# Patient Record
Sex: MALE | Race: WHITE | NOT HISPANIC OR LATINO | Employment: FULL TIME | URBAN - METROPOLITAN AREA
[De-identification: names, ages, dates, MRNs, and addresses within clinical notes are randomized per-mention and may not be internally consistent; named-entity substitution may affect disease eponyms.]

---

## 2018-05-30 ENCOUNTER — OFFICE VISIT (OUTPATIENT)
Dept: GASTROENTEROLOGY | Facility: CLINIC | Age: 18
End: 2018-05-30
Payer: COMMERCIAL

## 2018-05-30 VITALS
HEIGHT: 68 IN | TEMPERATURE: 97.4 F | BODY MASS INDEX: 21.92 KG/M2 | SYSTOLIC BLOOD PRESSURE: 107 MMHG | DIASTOLIC BLOOD PRESSURE: 66 MMHG | WEIGHT: 144.6 LBS | HEART RATE: 53 BPM

## 2018-05-30 DIAGNOSIS — K58.0 IRRITABLE BOWEL SYNDROME WITH DIARRHEA: Primary | ICD-10-CM

## 2018-05-30 DIAGNOSIS — K90.0 CELIAC DISEASE: ICD-10-CM

## 2018-05-30 PROCEDURE — 99204 OFFICE O/P NEW MOD 45 MIN: CPT | Performed by: INTERNAL MEDICINE

## 2018-05-30 RX ORDER — TRIAMCINOLONE ACETONIDE 1 MG/G
CREAM TOPICAL
COMMUNITY
Start: 2018-05-18 | End: 2018-06-12 | Stop reason: ALTCHOICE

## 2018-05-30 RX ORDER — DICYCLOMINE HYDROCHLORIDE 10 MG/1
10 CAPSULE ORAL 2 TIMES DAILY PRN
Qty: 60 CAPSULE | Refills: 0 | Status: SHIPPED | OUTPATIENT
Start: 2018-05-30 | End: 2018-06-12 | Stop reason: ALTCHOICE

## 2018-05-30 NOTE — PATIENT INSTRUCTIONS
EGD scheduled in [place] on 6/1/18  Hermes Biggs gave pt verbal instructions/paper work given   Lupe howe RM at 12:30pm

## 2018-05-30 NOTE — LETTER
May 31, 2018     Natalie De Dios DO  [facility], [address] [facility]    Patient: Gloria Moore   YOB: 2000   Date of Visit: 5/30/2018       Dear Dr Alma Linares: Thank you for referring Glorai Moore to me for evaluation  Below are my notes for this consultation  If you have questions, please do not hesitate to call me  I look forward to following your patient along with you  Sincerely,        Rosendo Craven MD        CC: No Recipients  Mikhail Delilah Urrutia  5/30/2018  4:11 PM  Sign at close encounter  [facility] - Outpatient Consultation  Gloria Moore 19 y o  male MRN: [id number]  Encounter: [id number]          ASSESSMENT AND PLAN:      1  Celiac disease    - Mr Odalis Metcalf presents with complains of alternating diarrhea and constipation, abdominal pain, and bloating  Recent labs showed tissue transaminase IgA was over 100  His B12 levels are normal   - labs show normal thyroid function and LFTs are within normal limits  - I will schedule an EGD to confirm this diagnosis  At the time of the procedure, I will take biopsies  I discussed with him the risks and benefits of the procedure  - I explained that T-cell lymphoma is associated with uncontrolled celiac disease  - I will order labs to check his vitamin D levels, hemoglobin A1C, and celiac antibody profile  He will follow up based on the results of the endoscopy  If biopsies are positive, we will refer him to a nutritionist and monitor his labs regularly  ______________________________________________________________________    HPI:      Gloria Moore is a 19 y o  male with complains of alternating diarrhea and constipation with abdominal pain and bloating  Recent labs showed tissue transaminase IgA was over 100   B12 levels are normal  He is otherwise healthy and denies change in stool caliber, melena, hematochezia, rectal bleeding, tenesmus, change in appetite, nausea, vomiting, GERD, dysphagia, odynophagia and unintentional weight loss  He is a nonsmoker, and denies alcohol use  REVIEW OF SYSTEMS:    CONSTITUTIONAL: Denies any fever, chills, rigors, and weight loss  HEENT: No earache or tinnitus  Denies hearing loss or visual disturbances  CARDIOVASCULAR: No chest pain or palpitations  RESPIRATORY: Denies any cough, hemoptysis, shortness of breath or dyspnea on exertion  GASTROINTESTINAL: As noted in the History of Present Illness  GENITOURINARY: No problems with urination  Denies any hematuria or dysuria  NEUROLOGIC: No dizziness or vertigo, denies headaches  MUSCULOSKELETAL: Denies any muscle or joint pain  SKIN: Denies skin rashes or itching  ENDOCRINE: Denies excessive thirst  Denies intolerance to heat or cold  PSYCHOSOCIAL: Denies depression or anxiety  Denies any recent memory loss  Historical Information   History reviewed  No pertinent past medical history  No past surgical history on file  Social History   History   Alcohol use Not on file     History   Drug use: Unknown     History   Smoking Status    Not on file   Smokeless Tobacco    Not on file     No family history on file  Meds/Allergies       Current Outpatient Prescriptions:     dicyclomine (BENTYL) 10 mg capsule    triamcinolone (KENALOG) 0 1 % cream    No Known Allergies        Objective     There were no vitals taken for this visit  There is no height or weight on file to calculate BMI  PHYSICAL EXAM:      General Appearance:   Alert, cooperative, no distress   HEENT:   Normocephalic, atraumatic, anicteric      Neck:  Supple, symmetrical, trachea midline   Lungs:   Clear to auscultation bilaterally; no rales, rhonchi or wheezing; respirations unlabored    Heart[de-identified]   Regular rate and rhythm; no murmur, rub, or gallop     Abdomen:   Soft, non-tender, non-distended; normal bowel sounds; no masses, no organomegaly    Genitalia:   Deferred    Rectal:   Deferred  Extremities:  No cyanosis, clubbing or edema    Pulses:  2+ and symmetric    Skin:  No jaundice, rashes, or lesions    Lymph nodes:  No palpable cervical lymphadenopathy        Attestation:   By signing my name below, I, Lee Mccall, attest that this documentation has been prepared under the direction and in the presence of Ahmet Meadows MD  Electronically Signed: Delilah Tsang  05/30/2018  Mitchel Denver, personally performed the services described in this documentation  All medical record entries made by the jhonatanibluciano were at my direction and in my presence  I have reviewed the chart and discharge instructions and agree that the record reflects my personal performance and is accurate and complete  Ahmet Meadows MD  05/30/2018

## 2018-05-30 NOTE — PROGRESS NOTES
[facility] - Outpatient Consultation  Jessica Cervantes 19 y o  male MRN: [id number]  Encounter: [id number]          ASSESSMENT AND PLAN:      1  Celiac disease    - Mr Peggy Christian presents with complains of alternating diarrhea and constipation, abdominal pain, and bloating  Recent labs showed tissue transaminase IgA was over 100  His B12 levels are normal   - labs show normal thyroid function and LFTs are within normal limits  - I will schedule an EGD to confirm this diagnosis  At the time of the procedure, I will take biopsies  I discussed with him the risks and benefits of the procedure  - I explained that T-cell lymphoma is associated with uncontrolled celiac disease  - I will order labs to check his vitamin D levels, hemoglobin A1C, and celiac antibody profile  He will follow up based on the results of the endoscopy  If biopsies are positive, we will refer him to a nutritionist and monitor his labs regularly  ______________________________________________________________________    HPI:      Jessica Cervantes is a 19 y o  male with complains of alternating diarrhea and constipation with abdominal pain and bloating  Recent labs showed tissue transaminase IgA was over 100  B12 levels are normal  He is otherwise healthy and denies change in stool caliber, melena, hematochezia, rectal bleeding, tenesmus, change in appetite, nausea, vomiting, GERD, dysphagia, odynophagia and unintentional weight loss  He is a nonsmoker, and denies alcohol use  REVIEW OF SYSTEMS:    CONSTITUTIONAL: Denies any fever, chills, rigors, and weight loss  HEENT: No earache or tinnitus  Denies hearing loss or visual disturbances  CARDIOVASCULAR: No chest pain or palpitations  RESPIRATORY: Denies any cough, hemoptysis, shortness of breath or dyspnea on exertion  GASTROINTESTINAL: As noted in the History of Present Illness  GENITOURINARY: No problems with urination   Denies any hematuria or dysuria  NEUROLOGIC: No dizziness or vertigo, denies headaches  MUSCULOSKELETAL: Denies any muscle or joint pain  SKIN: Denies skin rashes or itching  ENDOCRINE: Denies excessive thirst  Denies intolerance to heat or cold  PSYCHOSOCIAL: Denies depression or anxiety  Denies any recent memory loss  Historical Information   History reviewed  No pertinent past medical history  No past surgical history on file  Social History   History   Alcohol use Not on file     History   Drug use: Unknown     History   Smoking Status    Not on file   Smokeless Tobacco    Not on file     No family history on file  Meds/Allergies       Current Outpatient Prescriptions:     dicyclomine (BENTYL) 10 mg capsule    triamcinolone (KENALOG) 0 1 % cream    No Known Allergies        Objective     There were no vitals taken for this visit  There is no height or weight on file to calculate BMI  PHYSICAL EXAM:      General Appearance:   Alert, cooperative, no distress   HEENT:   Normocephalic, atraumatic, anicteric      Neck:  Supple, symmetrical, trachea midline   Lungs:   Clear to auscultation bilaterally; no rales, rhonchi or wheezing; respirations unlabored    Heart[de-identified]   Regular rate and rhythm; no murmur, rub, or gallop  Abdomen:   Soft, non-tender, non-distended; normal bowel sounds; no masses, no organomegaly    Genitalia:   Deferred    Rectal:   Deferred    Extremities:  No cyanosis, clubbing or edema    Pulses:  2+ and symmetric    Skin:  No jaundice, rashes, or lesions    Lymph nodes:  No palpable cervical lymphadenopathy        Attestation:   By signing my name below, I, Corina Swanson, attest that this documentation has been prepared under the direction and in the presence of Carter Spann MD  Electronically Signed: Delilah Meyers  05/30/2018  Rosario Singh personally performed the services described in this documentation   All medical record entries made by the jhonatanibluciano were at my direction and in my presence  I have reviewed the chart and discharge instructions and agree that the record reflects my personal performance and is accurate and complete  Lobo Jones MD  05/30/2018

## 2018-06-12 ENCOUNTER — HOSPITAL ENCOUNTER (EMERGENCY)
Facility: HOSPITAL | Age: 18
Discharge: HOME | End: 2018-06-12
Attending: EMERGENCY MEDICINE | Admitting: EMERGENCY MEDICINE
Payer: COMMERCIAL

## 2018-06-12 VITALS
SYSTOLIC BLOOD PRESSURE: 115 MMHG | TEMPERATURE: 98 F | BODY MASS INDEX: 21.98 KG/M2 | HEART RATE: 66 BPM | OXYGEN SATURATION: 100 % | HEIGHT: 68 IN | RESPIRATION RATE: 16 BRPM | WEIGHT: 145 LBS | DIASTOLIC BLOOD PRESSURE: 64 MMHG

## 2018-06-12 DIAGNOSIS — R11.0 NAUSEA: ICD-10-CM

## 2018-06-12 DIAGNOSIS — R51.9 HEADACHE: Primary | ICD-10-CM

## 2018-06-12 PROCEDURE — 96375 TX/PRO/DX INJ NEW DRUG ADDON: CPT

## 2018-06-12 PROCEDURE — 96374 THER/PROPH/DIAG INJ IV PUSH: CPT

## 2018-06-12 PROCEDURE — 96361 HYDRATE IV INFUSION ADD-ON: CPT

## 2018-06-12 PROCEDURE — 99283 EMERGENCY DEPT VISIT LOW MDM: CPT

## 2018-06-12 RX ORDER — DIPHENHYDRAMINE HYDROCHLORIDE 50 MG/ML
25 INJECTION INTRAMUSCULAR; INTRAVENOUS ONCE
Status: COMPLETED | OUTPATIENT
Start: 2018-06-12 | End: 2018-06-12

## 2018-06-12 RX ORDER — ONDANSETRON 4 MG/1
4 TABLET, FILM COATED ORAL EVERY 6 HOURS PRN
Qty: 20 TABLET | Refills: 0 | Status: ON HOLD | OUTPATIENT
Start: 2018-06-12 | End: 2018-08-10 | Stop reason: ALTCHOICE

## 2018-06-12 RX ORDER — KETOROLAC TROMETHAMINE 30 MG/ML
15 INJECTION, SOLUTION INTRAMUSCULAR; INTRAVENOUS ONCE
Status: COMPLETED | OUTPATIENT
Start: 2018-06-12 | End: 2018-06-12

## 2018-06-12 RX ORDER — METOCLOPRAMIDE HYDROCHLORIDE 5 MG/ML
10 INJECTION INTRAMUSCULAR; INTRAVENOUS ONCE
Status: COMPLETED | OUTPATIENT
Start: 2018-06-12 | End: 2018-06-12

## 2018-06-12 RX ADMIN — KETOROLAC TROMETHAMINE 15 MG: 30 INJECTION, SOLUTION INTRAMUSCULAR at 09:56

## 2018-06-12 RX ADMIN — SODIUM CHLORIDE 1000 ML: 0.9 INJECTION, SOLUTION INTRAVENOUS at 09:55

## 2018-06-12 RX ADMIN — DIPHENHYDRAMINE HYDROCHLORIDE 25 MG: 50 INJECTION, SOLUTION INTRAMUSCULAR; INTRAVENOUS at 09:53

## 2018-06-12 RX ADMIN — METOCLOPRAMIDE 10 MG: 5 INJECTION, SOLUTION INTRAMUSCULAR; INTRAVENOUS at 10:00

## 2018-06-12 NOTE — DISCHARGE INSTRUCTIONS
Acute Headache, Ambulatory Care   GENERAL INFORMATION:   An acute headache  is pain or discomfort that starts suddenly and gets worse quickly  The cause of an acute headache may not be known  It may be triggered by stress, fatigue, hormones, food, or trauma  Common related symptoms include the following:   · Fever    · Sinus pressure    · Loss of memory    · Nausea or vomiting    · Problems with your vision, such as watery or red eyes, loss of vision, or pain in bright light    · Stiff neck    · Tenderness of the head and neck area    · Trouble staying awake, or being less alert than usual     · Weakness or less energy  Seek immediate care for the following symptoms:   · Severe pain    · A headache that occurs after a blow to the head, a fall, or other trauma     · Confusion or forgetfulness    · Numbness on one side of your face or body  Treatment for an acute headache  may include medicine to decrease pain  You may also need biofeedback or cognitive behavioral therapy  Ask your healthcare provider about these and other treatments for an acute headache  Manage my symptoms:   · Apply heat  on your head for 20 to 30 minutes every 2 hours for as many days as directed  Heat helps decrease pain and muscle spasms  You may alternate heat and ice  · Apply ice  on your head for 15 to 20 minutes every hour or as directed  Use an ice pack, or put crushed ice in a plastic bag  Cover it with a towel  Ice helps decrease pain  · Relax your muscles  Lie down in a comfortable position and close your eyes  Relax your muscles slowly  Start at your toes and work your way up your body  · Keep a record of your headaches  Write down when your headaches start and stop  Include your symptoms and what you were doing when the headache began  Record what you ate or drank for 24 hours before the headache started  Describe the pain and where it hurts  Keep track of what you did to treat your headache and whether it worked    Follow up with your healthcare provider as directed:  Bring your headache record with you when you see your healthcare provider  Write down your questions so you remember to ask them during your visits  CARE AGREEMENT:   You have the right to help plan your care  Learn about your health condition and how it may be treated  Discuss treatment options with your caregivers to decide what care you want to receive  You always have the right to refuse treatment  The above information is an  only  It is not intended as medical advice for individual conditions or treatments  Talk to your doctor, nurse or pharmacist before following any medical regimen to see if it is safe and effective for you  © 2014 [address] is for End User's use only and may not be sold, redistributed or otherwise used for commercial purposes  All illustrations and images included in CareNotes® are the copyrighted property of A D A M , Inc  or [employer]ef[employer] G[employer]

## 2018-06-12 NOTE — ED PROVIDER NOTES
History  Chief Complaint   Patient presents with    Headache     Pt c/o headache with nausea for 3 days    Nausea     Patient presents with 2 days of a generalized headache  He states that because of the headache, he is nauseated and unable to eat or sleep  Patient does report having had  a migraine headache 2 years ago but states that this headache is not as bad as that one  He does report that the pain has been gradually worsening  He denies any neck pain, stiffness, trauma, numbness, or focal weakness  Patient denies any tick bites or rashes  No recent illnesses  He does state that he was recently diagnosed with celiac disease and has been reducing his fluid intake  He does state that he completely stopped 4 days ago  He believes this may be due to gluten withdrawal         History provided by:  Patient  Headache   Pain location:  Generalized  Quality:  Unable to specify  Radiates to:  Does not radiate  Severity currently:  5/10  Severity at highest:  5/10  Onset quality:  Gradual  Duration:  2 days  Timing:  Constant  Chronicity:  New  Relieved by:  Nothing  Worsened by:  Light and sound  Ineffective treatments:  Acetaminophen  Associated symptoms: fatigue, nausea and photophobia    Associated symptoms: no diarrhea, no eye pain, no fever, no myalgias, no neck pain, no neck stiffness, no numbness, no sore throat, no vomiting and no weakness    Risk factors: no family hx of SAH    Nausea   The primary symptoms include fatigue and nausea  Primary symptoms do not include fever, vomiting, diarrhea, dysuria, myalgias or rash  None       Past Medical History:   Diagnosis Date    Celiac disease        History reviewed  No pertinent surgical history  History reviewed  No pertinent family history  I have reviewed and agree with the history as documented      Social History   Substance Use Topics    Smoking status: Never Smoker    Smokeless tobacco: Never Used    Alcohol use No        Review of Systems   Constitutional: Positive for fatigue  Negative for fever  HENT: Negative for nosebleeds and sore throat  Eyes: Positive for photophobia  Negative for pain  Respiratory: Negative for chest tightness and shortness of breath  Cardiovascular: Negative for chest pain and palpitations  Gastrointestinal: Positive for nausea  Negative for diarrhea and vomiting  Genitourinary: Negative for dysuria and flank pain  Musculoskeletal: Negative for myalgias, neck pain and neck stiffness  Skin: Negative for rash and wound  Neurological: Positive for headaches  Negative for weakness and numbness  Psychiatric/Behavioral: Negative for agitation and confusion  All other systems reviewed and are negative  Physical Exam  Physical Exam   Constitutional: He is oriented to person, place, and time  He appears well-developed and well-nourished  HENT:   Head: Normocephalic and atraumatic  Eyes: EOM are normal  Pupils are equal, round, and reactive to light  Neck: Normal range of motion  Neck supple  Cardiovascular: Normal rate and regular rhythm  Pulmonary/Chest: Effort normal and breath sounds normal    Abdominal: Soft  Bowel sounds are normal    Neurological: He is alert and oriented to person, place, and time  Skin: Skin is warm and dry  Psychiatric: He has a normal mood and affect  His behavior is normal    Nursing note and vitals reviewed        Vital Signs  ED Triage Vitals [06/12/18 0914]   Temperature Pulse Respirations Blood Pressure SpO2   98 °F (36 7 °C) 64 18 126/82 99 %      Temp Source Heart Rate Source Patient Position - Orthostatic VS BP Location FiO2 (%)   Oral Monitor Sitting Left arm --      Pain Score       5           Vitals:    06/12/18 0914 06/12/18 1004   BP: 126/82 113/59   Pulse: 64 (!) 54   Patient Position - Orthostatic VS: Sitting Lying       Visual Acuity  Visual Acuity      Most Recent Value   L Pupil Size (mm)  3   R Pupil Size (mm)  3          ED Medications  Medications   ketorolac (TORADOL) injection 15 mg (15 mg Intravenous Given 6/12/18 0956)   metoclopramide (REGLAN) injection 10 mg (10 mg Intravenous Given 6/12/18 1000)   diphenhydrAMINE (BENADRYL) injection 25 mg (25 mg Intravenous Given 6/12/18 0953)   sodium chloride 0 9 % bolus 1,000 mL (1,000 mL Intravenous New Bag 6/12/18 0955)       Diagnostic Studies  Results Reviewed     None                 No orders to display              Procedures  Procedures       Phone Contacts  ED Phone Contact    ED Course  ED Course as of Jun 12 1112   Tue Jun 12, 2018   1107 Symptoms improved with migraine cocktail  Able to drink in room  Will discharge with Zofran and instructions to return if symptoms return or worsen  MDM  Number of Diagnoses or Management Options  Headache: new and does not require workup  Nausea: new and does not require workup  Risk of Complications, Morbidity, and/or Mortality  Presenting problems: moderate  Management options: moderate    Patient Progress  Patient progress: improved    CritCare Time    Disposition  Final diagnoses:   Headache   Nausea     Time reflects when diagnosis was documented in both MDM as applicable and the Disposition within this note     Time User Action Codes Description Comment    6/12/2018 11:10 AM Marily Juarez Add [R51] Headache     6/12/2018 11:11 AM Marily Juarez Add [R11 0] Nausea       ED Disposition     None      Follow-up Information    None         Patient's Medications   Discharge Prescriptions    ONDANSETRON (ZOFRAN) 4 MG TABLET    Take 1 tablet (4 mg total) by mouth every 6 (six) hours as needed for nausea or vomiting       Start Date: 6/12/2018 End Date: --       Order Dose: 4 mg       Quantity: 20 tablet    Refills: 0     No discharge procedures on file      ED Provider  Electronically Signed by           Demetria Grant MD  06/12/18   Staffa Le[address] Senora Nobles, MD  06/12/18 1116

## 2018-06-21 ENCOUNTER — APPOINTMENT (OUTPATIENT)
Dept: LAB | Facility: HOSPITAL | Age: 18
End: 2018-06-21
Attending: INTERNAL MEDICINE
Payer: COMMERCIAL

## 2018-06-21 DIAGNOSIS — K90.0 CELIAC DISEASE: ICD-10-CM

## 2018-06-21 LAB
25(OH)D3 SERPL-MCNC: 38.7 NG/ML (ref 30–100)
EST. AVERAGE GLUCOSE BLD GHB EST-MCNC: 114 MG/DL
HBA1C MFR BLD: 5.6 % (ref 4.2–6.3)

## 2018-06-21 PROCEDURE — 36415 COLL VENOUS BLD VENIPUNCTURE: CPT

## 2018-06-21 PROCEDURE — 83036 HEMOGLOBIN GLYCOSYLATED A1C: CPT

## 2018-06-21 PROCEDURE — 86255 FLUORESCENT ANTIBODY SCREEN: CPT

## 2018-06-21 PROCEDURE — 82306 VITAMIN D 25 HYDROXY: CPT

## 2018-06-21 PROCEDURE — 83516 IMMUNOASSAY NONANTIBODY: CPT

## 2018-06-21 PROCEDURE — 82784 ASSAY IGA/IGD/IGG/IGM EACH: CPT

## 2018-06-22 ENCOUNTER — TELEPHONE (OUTPATIENT)
Dept: GASTROENTEROLOGY | Facility: MEDICAL CENTER | Age: 18
End: 2018-06-22

## 2018-06-22 NOTE — TELEPHONE ENCOUNTER
Dr Ksenia Chávez pt called again requesting his lab results from 06/21/18 be emailed to him at [email or web address]

## 2018-06-22 NOTE — TELEPHONE ENCOUNTER
I called the patient to inform him that only two out of the three have been resulted   He said to wait until the celiac is resulted before emailing him

## 2018-06-23 LAB
ENDOMYSIUM IGA SER QL: POSITIVE
GLIADIN PEPTIDE IGA SER-ACNC: 220 UNITS (ref 0–19)
GLIADIN PEPTIDE IGG SER-ACNC: 116 UNITS (ref 0–19)
IGA SERPL-MCNC: 247 MG/DL (ref 90–386)
TTG IGA SER-ACNC: >100 U/ML (ref 0–3)
TTG IGG SER-ACNC: 4 U/ML (ref 0–5)

## 2018-06-25 NOTE — TELEPHONE ENCOUNTER
He has an office visit tomorrow with another doctor and would like the results prior to that appointment

## 2018-06-26 ENCOUNTER — TELEPHONE (OUTPATIENT)
Dept: GASTROENTEROLOGY | Facility: HOSPITAL | Age: 18
End: 2018-06-26

## 2018-06-26 ENCOUNTER — TELEPHONE (OUTPATIENT)
Dept: GASTROENTEROLOGY | Facility: MEDICAL CENTER | Age: 18
End: 2018-06-26

## 2018-06-26 NOTE — TELEPHONE ENCOUNTER
----- Message from Enrrique Beltran MD sent at 6/25/2018  9:11 PM EDT -----  Celiac serologies are positive as they were positive earlier endoscopy is recommended is be discussed in the office

## 2018-06-26 NOTE — TELEPHONE ENCOUNTER
----- Message from Anna Neves PA-C sent at 6/25/2018  7:37 PM EDT -----  Regarding: FW: results  Please let Mary Carmen Burleson know his TTG IGA antibody is significantly elevated > 100 which is highly suspicious for celiac disease  He also has elevations of several other celiac antibodies (which are less specific) however further increase suspicion for celiac disease      ----- Message -----  From: John Paul Linton MA  Sent: 6/25/2018   1:16 PM  To: [facility] Provider  Subject: results                                          Patient called looking for his celiac lab results  Please advise

## 2018-06-29 ENCOUNTER — TELEPHONE (OUTPATIENT)
Dept: GASTROENTEROLOGY | Facility: AMBULARY SURGERY CENTER | Age: 18
End: 2018-06-29

## 2018-06-29 NOTE — TELEPHONE ENCOUNTER
Pt mother called requesting a doctor request a call back regarding clearance form for school  Pt have an appt for September but need forms fill out before then for school

## 2018-07-05 NOTE — TELEPHONE ENCOUNTER
Spoke to mom and they have forms for him to have a room with a kitchen due to his dx of celiac, They spoke to the school and cafe and they were told it needed to be filled out before his fall classes start  He never had his egd done because he was busy that day and never rescheduled  They also wanted to know when should he have labs done again  A f/u appt is scheduled with CADENCE Whitten in [address] [facility]

## 2018-07-09 DIAGNOSIS — K90.0 CELIAC DISEASE: Primary | ICD-10-CM

## 2018-07-10 ENCOUNTER — APPOINTMENT (OUTPATIENT)
Dept: LAB | Facility: HOSPITAL | Age: 18
End: 2018-07-10
Attending: INTERNAL MEDICINE
Payer: COMMERCIAL

## 2018-07-10 DIAGNOSIS — K90.0 CELIAC DISEASE: ICD-10-CM

## 2018-07-10 PROCEDURE — 36415 COLL VENOUS BLD VENIPUNCTURE: CPT

## 2018-07-10 PROCEDURE — 82784 ASSAY IGA/IGD/IGG/IGM EACH: CPT

## 2018-07-10 PROCEDURE — 86255 FLUORESCENT ANTIBODY SCREEN: CPT

## 2018-07-10 PROCEDURE — 83516 IMMUNOASSAY NONANTIBODY: CPT

## 2018-07-10 NOTE — TELEPHONE ENCOUNTER
It is up to him but we has so we do not need a follow-up as I am not sure will impact anything in the short term    If he is short on time would probably just scheduled for an EGD and then follow-up afterwards which may be more meaningful

## 2018-07-10 NOTE — TELEPHONE ENCOUNTER
We do not mind filling this out  But he does need to follow-up  He also needs to get EGD preferably before school starts if possible  We can check repeat labs now put the order in

## 2018-07-10 NOTE — TELEPHONE ENCOUNTER
Patient is aware he will go for the labs and he scheduled a f/u appt in the office before he schedules for the egd

## 2018-07-11 LAB
ENDOMYSIUM IGA SER QL: POSITIVE
GLIADIN PEPTIDE IGA SER-ACNC: 243 UNITS (ref 0–19)
GLIADIN PEPTIDE IGG SER-ACNC: 118 UNITS (ref 0–19)
IGA SERPL-MCNC: 238 MG/DL (ref 90–386)
TTG IGA SER-ACNC: >100 U/ML (ref 0–3)
TTG IGG SER-ACNC: <2 U/ML (ref 0–5)

## 2018-07-13 ENCOUNTER — TELEPHONE (OUTPATIENT)
Dept: GASTROENTEROLOGY | Facility: CLINIC | Age: 18
End: 2018-07-13

## 2018-07-16 NOTE — TELEPHONE ENCOUNTER
Please email the patients lab results again to either [email or web address] or [email or web address]  COM they were not received before  Also, the patients mother states Hema Slater dropped of paperwork for school that needed to be filled out by the doctor at the [place] office, she would like to know the status on that   Please advise [phone]

## 2018-07-16 NOTE — TELEPHONE ENCOUNTER
I am going to mail this to the patient because I have emailed it 3 times and mom hasn't received it yet

## 2018-07-16 NOTE — TELEPHONE ENCOUNTER
Spoke to patient and he did get th e last email sent  Also once the letter is done and signed I will call him Dr Levi Bryant is aware of this

## 2018-07-17 NOTE — TELEPHONE ENCOUNTER
Pt's mom Darreld Holes called stating you can email the paperwork to her and she will forward it to YANA

## 2018-07-26 ENCOUNTER — TELEPHONE (OUTPATIENT)
Dept: GASTROENTEROLOGY | Facility: AMBULARY SURGERY CENTER | Age: 18
End: 2018-07-26

## 2018-07-26 ENCOUNTER — TELEPHONE (OUTPATIENT)
Dept: GASTROENTEROLOGY | Facility: CLINIC | Age: 18
End: 2018-07-26

## 2018-07-26 NOTE — TELEPHONE ENCOUNTER
Dr Puckett's pt     Pts mother Konrad Uriarte called requesting a new school note for the patient specifically stating that he needs to prepare his own meals because he only eats Gluten free  The school is asking for the note to be sent so they can dorm the pt in the appropriate dorm   Please contact chelsie to advise [phone]

## 2018-07-26 NOTE — TELEPHONE ENCOUNTER
I spoke with the patients Mother, Xi Miranda  She is requesting a letter for his dorm placement at [employer]  He is currently housed in a dorm with its own kitchen & he cooks his own meals because of his celiac dx  [facility] is trying to move him to a standard dorm room with no kitchen facilities  They are concerned that if he has to eat at the cafeteria on campus for all of his meals then he is at risk of cross contamination & Cara Clairho getting gluten exposure  She is asking for the letter to be emailed to her by the end of next week  I will work on it asap

## 2018-07-27 NOTE — TELEPHONE ENCOUNTER
I did email her the first letter and told her if the school needed more information to call us and let us know exactly what they are looking for

## 2018-07-30 NOTE — TELEPHONE ENCOUNTER
Letter done through [employer] and emailed to mothers email address  I called mom, Miah Savage, to confirm that I had sent it

## 2018-07-30 NOTE — TELEPHONE ENCOUNTER
Spoke to mom and she did get the first letter but now they are asking for a more detailed letter with his reactions he might get  Mom said he gets a blister rash on his face and arms

## 2018-07-30 NOTE — TELEPHONE ENCOUNTER
Sawyer Bowden, when did you do your letter? Can you call her to see if she needs another letter? I was not aware that you had already done it  Thank You!

## 2018-08-09 ENCOUNTER — ANESTHESIA EVENT (OUTPATIENT)
Dept: GASTROENTEROLOGY | Facility: MEDICAL CENTER | Age: 18
End: 2018-08-09
Payer: COMMERCIAL

## 2018-08-10 ENCOUNTER — ANESTHESIA (OUTPATIENT)
Dept: GASTROENTEROLOGY | Facility: MEDICAL CENTER | Age: 18
End: 2018-08-10
Payer: COMMERCIAL

## 2018-08-10 ENCOUNTER — HOSPITAL ENCOUNTER (OUTPATIENT)
Facility: MEDICAL CENTER | Age: 18
Setting detail: OUTPATIENT SURGERY
Discharge: HOME | End: 2018-08-10
Attending: INTERNAL MEDICINE | Admitting: INTERNAL MEDICINE
Payer: COMMERCIAL

## 2018-08-10 VITALS
DIASTOLIC BLOOD PRESSURE: 53 MMHG | TEMPERATURE: 98.1 F | HEART RATE: 45 BPM | HEIGHT: 68 IN | BODY MASS INDEX: 21.98 KG/M2 | RESPIRATION RATE: 16 BRPM | WEIGHT: 145 LBS | SYSTOLIC BLOOD PRESSURE: 101 MMHG | OXYGEN SATURATION: 97 %

## 2018-08-10 DIAGNOSIS — K58.0 IRRITABLE BOWEL SYNDROME WITH DIARRHEA: ICD-10-CM

## 2018-08-10 PROCEDURE — 43239 EGD BIOPSY SINGLE/MULTIPLE: CPT | Performed by: INTERNAL MEDICINE

## 2018-08-10 PROCEDURE — 88305 TISSUE EXAM BY PATHOLOGIST: CPT | Performed by: PATHOLOGY

## 2018-08-10 RX ORDER — SODIUM CHLORIDE 9 MG/ML
125 INJECTION, SOLUTION INTRAVENOUS CONTINUOUS
Status: DISCONTINUED | OUTPATIENT
Start: 2018-08-10 | End: 2018-08-10 | Stop reason: HOSPADM

## 2018-08-10 RX ORDER — PROPOFOL 10 MG/ML
INJECTION, EMULSION INTRAVENOUS AS NEEDED
Status: DISCONTINUED | OUTPATIENT
Start: 2018-08-10 | End: 2018-08-10 | Stop reason: SURG

## 2018-08-10 RX ADMIN — PROPOFOL 120 MG: 10 INJECTION, EMULSION INTRAVENOUS at 13:00

## 2018-08-10 RX ADMIN — PROPOFOL 50 MG: 10 INJECTION, EMULSION INTRAVENOUS at 13:01

## 2018-08-10 RX ADMIN — PROPOFOL 100 MG: 10 INJECTION, EMULSION INTRAVENOUS at 13:03

## 2018-08-10 RX ADMIN — SODIUM CHLORIDE 125 ML/HR: 0.9 INJECTION, SOLUTION INTRAVENOUS at 12:08

## 2018-08-10 NOTE — H&P
History and Physical -  [facility]  Isis Solorio 19 y o  male MRN: [id number]                  HPI: Isis Solorio is a 19y o  year old male who presents for  Celiac disease for evaluation  REVIEW OF SYSTEMS: Per the HPI, and otherwise unremarkable  Historical Information   Past Medical History:   Diagnosis Date    Celiac disease      History reviewed  No pertinent surgical history  Social History   History   Alcohol Use No     History   Drug Use No     History   Smoking Status    Never Smoker   Smokeless Tobacco    Never Used     History reviewed  No pertinent family history  Meds/Allergies     No prescriptions prior to admission  No Known Allergies    Objective     Blood pressure 116/74, pulse (!) 47, temperature 98 1 °F (36 7 °C), temperature source Oral, resp  rate 16, height 5' 8" (1 727 m), weight 65 8 kg (145 lb), SpO2 99 %  PHYSICAL EXAM    Gen: NAD  CV: RRR  CHEST: Clear  ABD: soft, NT/ND  EXT: no edema      ASSESSMENT/PLAN:  This is a 19y o  year old male here for   EGD and he is stable and optimized for his procedure

## 2018-08-10 NOTE — OP NOTE
**** GI/ENDOSCOPY REPORT ****     PATIENT NAME: Antonina Gibbons - VISIT ID:  Patient ID:   [id number] YOB: 2000     INTRODUCTION: Esophagogastroduodenoscopy - A 19 male patient presents for   an outpatient Esophagogastroduodenoscopy at [place] [address]  INDICATIONS: History of celiac disease  CONSENT: The benefits, risks, and alternatives to the procedure were   discussed and informed consent was obtained from the patient  PREPARATION:  EKG, pulse, pulse oximetry and blood pressure were monitored   throughout the procedure  MEDICATIONS: Anesthesia administered by anesthesiologist      PROCEDURE:  The endoscope was passed without difficulty through the mouth   under direct visualization and advanced to the 2nd portion of the   duodenum  The scope was withdrawn and the mucosa was carefully examined  FINDINGS:   Esophagus: The esophagus appeared to be normal   GE junction:   The GE junction appeared to be normal   Stomach: The stomach appeared to   be normal   Pylorus: The pylorus appeared to be normal, symmetrical, and   patent  Duodenum: Decreased folds in 2nd portion of the duodenum   consistent with history of celiac disease status post biopsies from the   bulb and 2nd portion for further evaluation  COMPLICATIONS: There were no complications  IMPRESSIONS: Normal esophagus  Normal GE junction  Normal stomach  Normal,   symmetrical, and patent pylorus  Decreased folds in 2nd portion of the   duodenum consistent with history of celiac disease status post biopsies   from the bulb and 2nd portion for further evaluation  RECOMMENDATIONS: Follow-up on the results of the biopsy specimens  Continue gluten free diet  ESTIMATED BLOOD LOSS:     PATHOLOGY SPECIMENS:     PROCEDURE CODES: 14002 - EGD flexible; with biopsy     ICD-9 Codes:     ICD-10 Codes:     PERFORMED BY: ASHLEY Rosales  on 08/10/2018    Version 1, electronically signed by ASHLEY Vargas  on 08/10/2018 at   13:11

## 2018-08-10 NOTE — DISCHARGE INSTRUCTIONS
Upper Endoscopy   WHAT YOU NEED TO KNOW:   An upper endoscopy is also called an upper gastrointestinal (GI) endoscopy, or an esophagogastroduodenoscopy (EGD)  You may feel bloated, gassy, or have some abdominal discomfort after your procedure  Your throat may be sore for 24 to 36 hours  You may burp or pass gas from air that is still inside your body  DISCHARGE INSTRUCTIONS:   Call 911 if:   · You have sudden chest pain or trouble breathing  Seek care immediately if:   · You feel dizzy or faint  · You have trouble swallowing  · You have severe throat pain  · Your bowel movements are very dark or black  · Your abdomen is hard and firm and you have severe pain  · You vomit blood  Contact your healthcare provider if:   · You feel full or bloated and cannot burp or pass gas  · You have not had a bowel movement for 3 days after your procedure  · You have neck pain  · You have a fever or chills  · You have nausea or are vomiting  · You have a rash or hives  · You have questions or concerns about your endoscopy  Relieve a sore throat:  Suck on throat lozenges or crushed ice  Gargle with a small amount of warm salt water  Mix 1 teaspoon of salt and 1 cup of warm water to make salt water  Relieve gas and discomfort from bloating:  Lie on your right side with a heating pad on your abdomen  Take short walks to help pass gas  Eat small meals until bloating is relieved  Rest after your procedure:  Do not drive or make important decisions until the day after your procedure  Return to your normal activity as directed  You can usually return to work the day after your procedure  Follow up with your healthcare provider as directed:  Write down your questions so you remember to ask them during your visits  © 2017 [employer] Information is for End User's use only and may not be sold, redistributed or otherwise used for commercial purposes   All illustrations and images included in CareNotes® are the copyrighted property of ELMER [employer]IRE[employer]  or [facility]  The above information is an  only  It is not intended as medical advice for individual conditions or treatments  Talk to your doctor, nurse or pharmacist before following any medical regimen to see if it is safe and effective for you  Gluten-Free Diet   WHAT YOU NEED TO KNOW:   A gluten-free diet is a meal plan that does not contain any gluten  Gluten is a protein found in wheat, rye, and barley  Gluten is found in many breads, pastas, cereals, cakes, pies, and cookies  Some vitamin supplements and medicines may also contain gluten  You need to follow this diet for the rest of your life if you have celiac disease, dermatitis herpetiformis, or non-celiac gluten sensitivity  DISCHARGE INSTRUCTIONS:   Contact your healthcare provider or dietitian if:   · You must take a medicine or vitamin that contains gluten  · Signs and symptoms of your condition get worse  · You are losing weight, without trying  · You have questions or concerns about your condition or care  Follow up with your healthcare provider or dietitian as directed:  Write down your questions so you remember to ask them during your visits  Foods to avoid:  Do not eat foods that contain the following ingredients:  · Barley, barley malt, barley extract, rye, bulgar, semolina, and triticale    · Wheat, wheat bran, wheat germ, and wheat starch     · Wheat varieties such as kamut and spelt    · Bran, couscous, durum, farina, and orzo    · Matzo flour, matzo meal, miguelangel flour     · Oats that are not labeled as gluten-free, unless your dietitian has allowed you to eat a small amount    · Malt extract and malt flavoring    · Einkorn, emmer, faro, panko, seitan, and udon  Foods you can have:  Choose foods labeled as gluten-free  You may be able to eat gluten-free oats, or you may be able to eat regular oats in small amounts  Ask your dietitian if oats are safe for you to eat  You may eat foods that are made with the following types of grains and starches:   · Corn, potato starch, and potato flour    · Soy, tapioca, and teff    · Rice, rice bran, quinoa, sago, and sorghum    · Amaranth, arrowroot, and buckwheat    · Flours made from nuts, beans, and seeds    · Flax, millet, and montina  Examples of gluten-free foods:   · Fresh fruits and vegetables    · Eggs, meat, fish, and poultry    · Dried beans, lentils, and peas    · Beverages such as 100% fruit juice, coffee, tea, cocoa, and soft drinks    · Fats and oils, such as butter, margarine, and vegetable, canola, and olive oils    · Regular, unflavored milk, cottage cheese, most yogurts, and aged cheese such as cheddar cheese    · Cream, sour cream, cream cheese, most ice creams, and sherbets    · Cream of rice, grits, puffed rice, brown rice, and white rice    · Corn tacos and tortillas  Sample menu for 1 day:   · Breakfast:  Soft boiled eggs, gluten-free toast with butter, and milk    · Morning snack:  Gluten-free rice cakes or crackers    · Lunch:  Tuna salad with tomato and lettuce, and an apple     · Afternoon snack:  Carrot sticks    · Dinner:  Broiled chicken, baked potato, green beans, and sorbet with strawberries  Ways to make a gluten-free diet part of your lifestyle:   · Follow up with your healthcare provider or dietitian as directed  It may take time to learn how to properly follow a gluten-free diet  Your dietitian can help you find ways to fit this diet into your lifestyle  · Learn to read food labels  Gluten is found in many foods and drinks  It may not be clear which foods contain gluten  Include a variety of allowed foods so that you can get all the nutrients you need  · Prepare for restaurant meals  Carry a list of items allowed on this diet with you when you are away from home   Go to the restaurant's website or call ahead to find out if the restaurant has gluten-free meals  Tell the  that you cannot eat wheat, rye, or barley  Ask how food is prepared  · Prepare gluten-free foods separately from other foods  Cross-contamination is when foods that contain gluten get mixed in with gluten-free foods  Prevent cross contamination by cleaning counter tops and cutting boards well to remove any crumbs that contain gluten  Wash cooking utensils, colanders, and pans well before you cook gluten-free foods  Buy a separate toaster to use for gluten-free breads  Buy separate jam, jelly, mayonnaise, or peanut butter to use on gluten-free breads to avoid cross contamination  These foods are also available in squeeze containers  · Include naturally gluten-free foods that are high in iron, folate, and vitamin B12  Foods high in iron and vitamin B12 include meat, fish, poultry (chicken and fish), liver, and eggs  Foods high in folate include broccoli, asparagus, orange juice, legumes, peanuts, walnuts, and sunflower seeds  · Ask your healthcare provider if you need a vitamin and mineral supplement  Celiac disease and dermatitis herpetiformis can cause damage to your intestines  This damage can decrease the absorption of certain vitamins and minerals  Also, many gluten-free cereals, pastas, and breads are not fortified with vitamin B and iron  © 2017 [employer] Information is for End User's use only and may not be sold, redistributed or otherwise used for commercial purposes  All illustrations and images included in CareNotes® are the copyrighted property of [employer]  or [employer]  The above information is an  only  It is not intended as medical advice for individual conditions or treatments  Talk to your doctor, nurse or pharmacist before following any medical regimen to see if it is safe and effective for you

## 2018-08-10 NOTE — ANESTHESIA PREPROCEDURE EVALUATION
Review of Systems/Medical History          Cardiovascular  Negative cardio ROS    Pulmonary  Negative pulmonary ROS        GI/Hepatic      Comment: celiac     Negative  ROS        Endo/Other  Negative endo/other ROS      GYN  Negative gynecology ROS          Hematology  Negative hematology ROS      Musculoskeletal  Negative musculoskeletal ROS        Neurology  Negative neurology ROS      Psychology   Negative psychology ROS              Physical Exam    Airway    Mallampati score: II  TM Distance: >3 FB  Neck ROM: full     Dental   No notable dental hx     Cardiovascular  Comment: Negative ROS, Cardiovascular exam normal    Pulmonary  Pulmonary exam normal     Other Findings        Anesthesia Plan  ASA Score- 2     Anesthesia Type- IV sedation with anesthesia with ASA Monitors  Additional Monitors:   Airway Plan:         Plan Factors-    Induction- intravenous  Postoperative Plan-     Informed Consent- Anesthetic plan and risks discussed with patient

## 2018-08-15 ENCOUNTER — TELEPHONE (OUTPATIENT)
Dept: GASTROENTEROLOGY | Facility: MEDICAL CENTER | Age: 18
End: 2018-08-15

## 2018-08-15 NOTE — TELEPHONE ENCOUNTER
----- Message from Hilario Urbano MD sent at 8/14/2018  9:08 PM EDT -----  Biopsies are consistent with celiac disease  This is Thomes Cancel 1 type which is the earlier type  Continue gluten free diet  Will monitor serologies every 3 months  Will also repeat endoscopy in 8 months to 1 year  Patient should also follow up in the office in 3-6 months

## 2018-08-21 ENCOUNTER — OFFICE VISIT (OUTPATIENT)
Dept: OBGYN CLINIC | Facility: OTHER | Age: 18
End: 2018-08-21
Payer: COMMERCIAL

## 2018-08-21 ENCOUNTER — APPOINTMENT (OUTPATIENT)
Dept: RADIOLOGY | Facility: OTHER | Age: 18
End: 2018-08-21
Payer: COMMERCIAL

## 2018-08-21 VITALS
BODY MASS INDEX: 21.64 KG/M2 | DIASTOLIC BLOOD PRESSURE: 74 MMHG | WEIGHT: 142.8 LBS | HEART RATE: 50 BPM | HEIGHT: 68 IN | SYSTOLIC BLOOD PRESSURE: 115 MMHG

## 2018-08-21 DIAGNOSIS — M25.572 LEFT ANKLE PAIN, UNSPECIFIED CHRONICITY: ICD-10-CM

## 2018-08-21 DIAGNOSIS — S86.312A STRAIN OF PERONEAL TENDON OF LEFT FOOT, INITIAL ENCOUNTER: ICD-10-CM

## 2018-08-21 DIAGNOSIS — M25.572 LEFT ANKLE PAIN, UNSPECIFIED CHRONICITY: Primary | ICD-10-CM

## 2018-08-21 PROCEDURE — 73610 X-RAY EXAM OF ANKLE: CPT

## 2018-08-21 PROCEDURE — 99203 OFFICE O/P NEW LOW 30 MIN: CPT | Performed by: ORTHOPAEDIC SURGERY

## 2018-08-21 NOTE — PROGRESS NOTES
Assessment:       1  Left ankle pain, unspecified chronicity    2  Strain of peroneal tendon of left foot, initial encounter          Plan:        I explained my current clinical findings and reviewed radiological findings with Davina Huang today  His ankle/lateral leg discomfort is likely secondary to a mild peroneal strain in the area  I have given him a printed handout of exercises regarding peroneal tendinitis and also discussed treatment modalities with his college  [employer]ia over the phone in the presence of the athlete  He may participate in sports activities as tolerated and would likely benefit from ankle taping during sports participation  I will see him back in the office if the any further concerns in this regard or symptom worsening  Subjective:     Patient ID: Riccardo Cuellar is a 19 y o  male  Chief Complaint:    HPI  Davina Huang is a 70-year-old [place]  who is here today for evaluation of mild intermittent left lateral ankle/distal leg pain of approximately 1 month duration  He reports that around a month ago he had a direct impact by another player's soccer cleats onto the lateral aspect of the left ankle which also resulted in a hyperflexion type injury of his left ankle and foot  He did not have any snapping or popping sensation but did have some sharp pain in the area with some difficulty initially weight-bearing  Thereafter, his pain has significantly improved and he is able to play  However, he still gets mild intermittent sharp pain in the left lateral retro malleolar area of the ankle which radiates proximally to the distal 3rd of the left lateral leg  It is not associated with any tingling numbness of the lateral ankle or foot  He denies having any previous injuries of the left ankle or foot  Currently, he has not done any treatment in this regard  Social History     Occupational History    Not on file       Social History Main Topics    Smoking status: Never Smoker    Smokeless tobacco: Never Used    Alcohol use No    Drug use: No    Sexual activity: Not on file      Review of Systems   Constitutional: Negative  HENT: Negative  Eyes: Negative  Respiratory: Negative  Cardiovascular: Negative  Gastrointestinal: Negative  Endocrine: Negative  Genitourinary: Negative  Skin: Negative  Allergic/Immunologic: Negative  Neurological: Negative  Psychiatric/Behavioral: Negative  Objective:     Ortho ExamPhysical Exam   Constitutional: He is oriented to person, place, and time  He appears well-developed and well-nourished  HENT:   Head: Normocephalic and atraumatic  Eyes: Conjunctivae are normal    Cardiovascular: Normal rate and regular rhythm  Pulmonary/Chest: Effort normal  No respiratory distress  Neurological: He is alert and oriented to person, place, and time  Skin: Skin is warm  No erythema  Psychiatric: He has a normal mood and affect  His behavior is normal  Judgment and thought content normal        Left ankle examination:  No swelling or deformity noted  No significant areas of tenderness over the distal fibula, tibia, syndesmosis or joint line  Full range of left ankle motion in all directions  Describes that the pain is present in the retro malleolar area over the peroneal tendons intermittently  Currently has grade 5/5 strength of the left ankle eversion with palpable clicking over the peroneal tendons but no palpable tendon subluxation  Tinel's is negative over the sural nerve  Negative anterior drawer test   Negative talar tilt test   No other midfoot or forefoot tenderness  There is mild tenderness to palpation over the distal fibula junction of mid and lower 3rd  There is negative syndesmotic squeeze and no pain with passive external rotation of the left ankle    I have personally reviewed pertinent films in PACS and my interpretation is Plain radiograph of the left ankle which includes the distal half of the tibia and fibula does not recur we will any acute fracture or dislocation  Mary Carmen Dudley

## 2018-09-24 ENCOUNTER — OFFICE VISIT (OUTPATIENT)
Dept: GASTROENTEROLOGY | Facility: CLINIC | Age: 18
End: 2018-09-24
Payer: COMMERCIAL

## 2018-09-24 ENCOUNTER — APPOINTMENT (OUTPATIENT)
Dept: LAB | Facility: HOSPITAL | Age: 18
End: 2018-09-24
Payer: COMMERCIAL

## 2018-09-24 VITALS
HEART RATE: 54 BPM | TEMPERATURE: 96.1 F | BODY MASS INDEX: 21.86 KG/M2 | SYSTOLIC BLOOD PRESSURE: 115 MMHG | HEIGHT: 68 IN | DIASTOLIC BLOOD PRESSURE: 72 MMHG | WEIGHT: 144.2 LBS

## 2018-09-24 DIAGNOSIS — E61.1 IRON DEFICIENCY: Primary | ICD-10-CM

## 2018-09-24 DIAGNOSIS — K90.0 CELIAC DISEASE: ICD-10-CM

## 2018-09-24 DIAGNOSIS — K90.0 CELIAC DISEASE: Primary | ICD-10-CM

## 2018-09-24 LAB
BASOPHILS # BLD AUTO: 0.04 THOUSANDS/ΜL (ref 0–0.1)
BASOPHILS NFR BLD AUTO: 1 % (ref 0–1)
EOSINOPHIL # BLD AUTO: 0.16 THOUSAND/ΜL (ref 0–0.61)
EOSINOPHIL NFR BLD AUTO: 3 % (ref 0–6)
ERYTHROCYTE [DISTWIDTH] IN BLOOD BY AUTOMATED COUNT: 13.1 % (ref 11.6–15.1)
FERRITIN SERPL-MCNC: 17 NG/ML (ref 8–388)
HCT VFR BLD AUTO: 45.3 % (ref 36.5–49.3)
HGB BLD-MCNC: 15 G/DL (ref 12–17)
IMM GRANULOCYTES # BLD AUTO: 0.01 THOUSAND/UL (ref 0–0.2)
IMM GRANULOCYTES NFR BLD AUTO: 0 % (ref 0–2)
IRON SATN MFR SERPL: 15 %
IRON SERPL-MCNC: 51 UG/DL (ref 65–175)
LYMPHOCYTES # BLD AUTO: 1.89 THOUSANDS/ΜL (ref 0.6–4.47)
LYMPHOCYTES NFR BLD AUTO: 29 % (ref 14–44)
MCH RBC QN AUTO: 29.7 PG (ref 26.8–34.3)
MCHC RBC AUTO-ENTMCNC: 33.1 G/DL (ref 31.4–37.4)
MCV RBC AUTO: 90 FL (ref 82–98)
MONOCYTES # BLD AUTO: 0.54 THOUSAND/ΜL (ref 0.17–1.22)
MONOCYTES NFR BLD AUTO: 8 % (ref 4–12)
NEUTROPHILS # BLD AUTO: 3.89 THOUSANDS/ΜL (ref 1.85–7.62)
NEUTS SEG NFR BLD AUTO: 59 % (ref 43–75)
NRBC BLD AUTO-RTO: 0 /100 WBCS
PLATELET # BLD AUTO: 238 THOUSANDS/UL (ref 149–390)
PMV BLD AUTO: 10.4 FL (ref 8.9–12.7)
RBC # BLD AUTO: 5.05 MILLION/UL (ref 3.88–5.62)
TIBC SERPL-MCNC: 350 UG/DL (ref 250–450)
WBC # BLD AUTO: 6.53 THOUSAND/UL (ref 4.31–10.16)

## 2018-09-24 PROCEDURE — 99212 OFFICE O/P EST SF 10 MIN: CPT | Performed by: PHYSICIAN ASSISTANT

## 2018-09-24 PROCEDURE — 83550 IRON BINDING TEST: CPT

## 2018-09-24 PROCEDURE — 85025 COMPLETE CBC W/AUTO DIFF WBC: CPT

## 2018-09-24 PROCEDURE — 83516 IMMUNOASSAY NONANTIBODY: CPT

## 2018-09-24 PROCEDURE — 83540 ASSAY OF IRON: CPT

## 2018-09-24 PROCEDURE — 36415 COLL VENOUS BLD VENIPUNCTURE: CPT

## 2018-09-24 PROCEDURE — 82728 ASSAY OF FERRITIN: CPT

## 2018-09-24 RX ORDER — FERROUS SULFATE TAB EC 324 MG (65 MG FE EQUIVALENT) 324 (65 FE) MG
324 TABLET DELAYED RESPONSE ORAL
Qty: 30 TABLET | Refills: 3 | Status: SHIPPED | OUTPATIENT
Start: 2018-09-24 | End: 2019-03-11 | Stop reason: SDUPTHER

## 2018-09-24 NOTE — LETTER
September 24, 2018     Natalie De Dios DO  [facility], [address] [facility]    Patient: Gloria Moore   YOB: 2000   Date of Visit: 9/24/2018       Dear Dr Alma Linares: Thank you for referring Gloria Moore to me for evaluation  Below are my notes for this consultation  If you have questions, please do not hesitate to call me  I look forward to following your patient along with you  Sincerely,        Daxa Sethi PA-C        CC: No Recipients  Daxa Sethi PA-C  9/24/2018  8:28 AM  Sign at close encounter  [facility] - Outpatient Follow-up Note  Gloria Moore 19 y o  male MRN: [id number]  Encounter: [id number]          ASSESSMENT AND PLAN:      1  Celiac disease  He was recently diagnosed with celiac disease and has been following a strict gluten free diet  He reports some fatigue and mild weight loss, but denies abdominal cramping and diarrhea  We will check CBC, iron panel, and repeat his tissue transglutaminase IgA levels  We will also refer to a nutritionist for gluten free diet recommendations  He will follow-up in 6 months time  - CBC and differential; Future  - Iron Panel; Future  - Tissue transglutaminase, IgA; Future  - Ambulatory referral to Nutrition Services; Future  ______________________________________________________________________    SUBJECTIVE:  78-year-old male presenting for follow-up of celiac disease  He was found to have elevated TTG IgA levels > 100  EGD significant for decreased folds in the 2nd portion of the duodenum, biopsies consistent with type 1 Marsh celiac disease  He states he has been following a strict gluten free diet  He has not seen a nutritionist yet  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  He does admit to feeling more fatigued lately  He does not eat meat  He denies nausea, vomiting, abdominal pain, diarrhea, and bleeding  He admits to losing 5 pounds in recent weeks  REVIEW OF SYSTEMS IS OTHERWISE NEGATIVE  Historical Information   Past Medical History:   Diagnosis Date    Celiac disease      Past Surgical History:   Procedure Laterality Date    [place] ESOPHAGOGASTRODUODENOSCOPY TRANSORAL DIAGNOSTIC N/A 8/10/2018    Procedure: ESOPHAGOGASTRODUODENOSCOPY (EGD); Surgeon: Debora Oro MD;  Location: [facility] GI LAB; Service: Gastroenterology     Social History   History   Alcohol Use No     History   Drug Use No     History   Smoking Status    Never Smoker   Smokeless Tobacco    Never Used     History reviewed  No pertinent family history  Meds/Allergies     No current outpatient prescriptions on file  No Known Allergies        Objective     Blood pressure 115/72, pulse (!) 54, temperature (!) 96 1 °F (35 6 °C), temperature source Tympanic, height 5' 8" (1 727 m), weight 65 4 kg (144 lb 3 2 oz)  Body mass index is 21 93 kg/m²  PHYSICAL EXAM:      General Appearance:   Alert, cooperative, no distress   HEENT:   Normocephalic, atraumatic, anicteric      Neck:  Supple, symmetrical, trachea midline   Lungs:   Clear to auscultation bilaterally; no rales, rhonchi or wheezing; respirations unlabored    Heart[de-identified]   Regular rate and rhythm; no murmur, rub, or gallop  Abdomen:   Soft, non-tender, non-distended; normal bowel sounds; no masses, no organomegaly    Genitalia:   Deferred    Rectal:   Deferred    Extremities:  No cyanosis, clubbing or edema    Pulses:  2+ and symmetric    Skin:  No jaundice, rashes, or lesions    Lymph nodes:  No palpable cervical lymphadenopathy        Lab Results:   No visits with results within 1 Day(s) from this visit     Latest known visit with results is:   Admission on 08/10/2018, Discharged on 08/10/2018   Component Date Value    Case Report 08/10/2018                      Value:Surgical Pathology Report                         Case: [id number]                                   Authorizing Provider:  Debora Oro MD Collected:           08/10/2018 1304              Ordering Location:     [facility]        Received:            08/10/2018 [address] [facility] Endoscopy                                                     Pathologist:           Bronson Zaragoza MD                                                              Specimen:    Duodenum, cold bx's r/o celiac                                                             Final Diagnosis 08/10/2018                      Value: This result contains rich text formatting which cannot be displayed here   Note 08/10/2018                      Value: This result contains rich text formatting which cannot be displayed here   Additional Information 08/10/2018                      Value: This result contains rich text formatting which cannot be displayed here  Donald Ambrosio Gross Description 08/10/2018                      Value: This result contains rich text formatting which cannot be displayed here  Radiology Results:   No results found

## 2018-09-24 NOTE — PATIENT INSTRUCTIONS
Nutrition appt scheduled at [facility] on 11/5/2018 at 10am  Pt aware he needs to contact insurance to see if it will be covered  If not $40 for each half hour

## 2018-09-25 LAB — TTG IGA SER-ACNC: 63 U/ML (ref 0–3)

## 2019-02-05 ENCOUNTER — TELEPHONE (OUTPATIENT)
Dept: GASTROENTEROLOGY | Facility: MEDICAL CENTER | Age: 19
End: 2019-02-05

## 2019-02-05 NOTE — TELEPHONE ENCOUNTER
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

## 2019-02-06 ENCOUNTER — PREP FOR PROCEDURE (OUTPATIENT)
Dept: GASTROENTEROLOGY | Facility: MEDICAL CENTER | Age: 19
End: 2019-02-06

## 2019-02-06 DIAGNOSIS — K90.0 CELIAC DISEASE: Primary | ICD-10-CM

## 2019-02-06 NOTE — PATIENT INSTRUCTIONS
Pt is scheduled with dr Yojana Gonzalez at [facility] for 1yr recall egd on 3/11/19, I emailed instructions to [email or web address]   Pt is aware he will need a ride to and from and get a call the Friday before with exact time of arrival

## 2019-02-06 NOTE — TELEPHONE ENCOUNTER
Pt is scheduled with dr Florinda Chiang at [address] [place] [address] for 1yr recall egd on 3/11/19, I emailed instructions to [email or web address]   Pt is aware he will need a ride to and from and get a call the Friday before with exact time of arrival

## 2019-02-06 NOTE — TELEPHONE ENCOUNTER
Patient is aware and he will get this done at a [facility]

## 2019-02-27 ENCOUNTER — APPOINTMENT (OUTPATIENT)
Dept: LAB | Facility: HOSPITAL | Age: 19
End: 2019-02-27
Payer: COMMERCIAL

## 2019-02-27 DIAGNOSIS — K90.0 CELIAC DISEASE: ICD-10-CM

## 2019-02-27 LAB
BASOPHILS # BLD AUTO: 0.02 THOUSANDS/ΜL (ref 0–0.1)
BASOPHILS NFR BLD AUTO: 0 % (ref 0–1)
EOSINOPHIL # BLD AUTO: 0.07 THOUSAND/ΜL (ref 0–0.61)
EOSINOPHIL NFR BLD AUTO: 2 % (ref 0–6)
ERYTHROCYTE [DISTWIDTH] IN BLOOD BY AUTOMATED COUNT: 12.8 % (ref 11.6–15.1)
FERRITIN SERPL-MCNC: 31 NG/ML (ref 8–388)
HCT VFR BLD AUTO: 41.9 % (ref 36.5–49.3)
HGB BLD-MCNC: 14.2 G/DL (ref 12–17)
IMM GRANULOCYTES # BLD AUTO: 0 THOUSAND/UL (ref 0–0.2)
IMM GRANULOCYTES NFR BLD AUTO: 0 % (ref 0–2)
IRON SATN MFR SERPL: 38 %
IRON SERPL-MCNC: 119 UG/DL (ref 65–175)
LYMPHOCYTES # BLD AUTO: 1.77 THOUSANDS/ΜL (ref 0.6–4.47)
LYMPHOCYTES NFR BLD AUTO: 38 % (ref 14–44)
MCH RBC QN AUTO: 30.3 PG (ref 26.8–34.3)
MCHC RBC AUTO-ENTMCNC: 33.9 G/DL (ref 31.4–37.4)
MCV RBC AUTO: 89 FL (ref 82–98)
MONOCYTES # BLD AUTO: 0.44 THOUSAND/ΜL (ref 0.17–1.22)
MONOCYTES NFR BLD AUTO: 9 % (ref 4–12)
NEUTROPHILS # BLD AUTO: 2.38 THOUSANDS/ΜL (ref 1.85–7.62)
NEUTS SEG NFR BLD AUTO: 51 % (ref 43–75)
NRBC BLD AUTO-RTO: 0 /100 WBCS
PLATELET # BLD AUTO: 221 THOUSANDS/UL (ref 149–390)
PMV BLD AUTO: 11.1 FL (ref 8.9–12.7)
RBC # BLD AUTO: 4.69 MILLION/UL (ref 3.88–5.62)
TIBC SERPL-MCNC: 311 UG/DL (ref 250–450)
WBC # BLD AUTO: 4.68 THOUSAND/UL (ref 4.31–10.16)

## 2019-02-27 PROCEDURE — 85025 COMPLETE CBC W/AUTO DIFF WBC: CPT

## 2019-02-27 PROCEDURE — 83550 IRON BINDING TEST: CPT

## 2019-02-27 PROCEDURE — 82728 ASSAY OF FERRITIN: CPT

## 2019-02-27 PROCEDURE — 36415 COLL VENOUS BLD VENIPUNCTURE: CPT

## 2019-02-27 PROCEDURE — 83540 ASSAY OF IRON: CPT

## 2019-03-08 ENCOUNTER — ANESTHESIA EVENT (OUTPATIENT)
Dept: GASTROENTEROLOGY | Facility: MEDICAL CENTER | Age: 19
End: 2019-03-08
Payer: COMMERCIAL

## 2019-03-11 ENCOUNTER — HOSPITAL ENCOUNTER (OUTPATIENT)
Facility: MEDICAL CENTER | Age: 19
Setting detail: OUTPATIENT SURGERY
Discharge: HOME | End: 2019-03-11
Attending: INTERNAL MEDICINE | Admitting: INTERNAL MEDICINE
Payer: COMMERCIAL

## 2019-03-11 ENCOUNTER — ANESTHESIA (OUTPATIENT)
Dept: GASTROENTEROLOGY | Facility: MEDICAL CENTER | Age: 19
End: 2019-03-11
Payer: COMMERCIAL

## 2019-03-11 VITALS
BODY MASS INDEX: 22.73 KG/M2 | SYSTOLIC BLOOD PRESSURE: 101 MMHG | DIASTOLIC BLOOD PRESSURE: 59 MMHG | HEIGHT: 68 IN | TEMPERATURE: 98.5 F | WEIGHT: 150 LBS | OXYGEN SATURATION: 98 % | RESPIRATION RATE: 16 BRPM | HEART RATE: 66 BPM

## 2019-03-11 DIAGNOSIS — K90.0 CELIAC DISEASE: ICD-10-CM

## 2019-03-11 DIAGNOSIS — K90.0 CELIAC DISEASE: Primary | ICD-10-CM

## 2019-03-11 DIAGNOSIS — E61.1 IRON DEFICIENCY: ICD-10-CM

## 2019-03-11 PROCEDURE — 43239 EGD BIOPSY SINGLE/MULTIPLE: CPT | Performed by: INTERNAL MEDICINE

## 2019-03-11 PROCEDURE — 88305 TISSUE EXAM BY PATHOLOGIST: CPT | Performed by: PATHOLOGY

## 2019-03-11 RX ORDER — SODIUM CHLORIDE 9 MG/ML
125 INJECTION, SOLUTION INTRAVENOUS CONTINUOUS
Status: DISCONTINUED | OUTPATIENT
Start: 2019-03-11 | End: 2019-03-11 | Stop reason: HOSPADM

## 2019-03-11 RX ORDER — FERROUS SULFATE TAB EC 324 MG (65 MG FE EQUIVALENT) 324 (65 FE) MG
324 TABLET DELAYED RESPONSE ORAL
Qty: 30 TABLET | Refills: 7 | Status: SHIPPED | OUTPATIENT
Start: 2019-03-11

## 2019-03-11 RX ORDER — PROPOFOL 10 MG/ML
INJECTION, EMULSION INTRAVENOUS AS NEEDED
Status: DISCONTINUED | OUTPATIENT
Start: 2019-03-11 | End: 2019-03-11 | Stop reason: SURG

## 2019-03-11 RX ADMIN — PROPOFOL 150 MG: 10 INJECTION, EMULSION INTRAVENOUS at 10:07

## 2019-03-11 RX ADMIN — SODIUM CHLORIDE 125 ML/HR: 0.9 INJECTION, SOLUTION INTRAVENOUS at 09:11

## 2019-03-11 RX ADMIN — PROPOFOL 100 MG: 10 INJECTION, EMULSION INTRAVENOUS at 10:10

## 2019-03-11 NOTE — H&P
History and Physical - [facility]  Natasha Best 19 y o  male MRN: [id number]                  HPI: Natasha Best is a 19y o  year old male who presents for evaluation of celiac disease    REVIEW OF SYSTEMS: Per the HPI, and otherwise unremarkable  Historical Information   Past Medical History:   Diagnosis Date    Anemia     iron defeincy anemia    Celiac disease      Past Surgical History:   Procedure Laterality Date    [place] ESOPHAGOGASTRODUODENOSCOPY TRANSORAL DIAGNOSTIC N/A 8/10/2018    Procedure: ESOPHAGOGASTRODUODENOSCOPY (EGD); Surgeon: Autumn Santa MD;  Location: [facility] GI LAB; Service: Gastroenterology     Social History   Social History     Substance and Sexual Activity   Alcohol Use No     Social History     Substance and Sexual Activity   Drug Use No     Social History     Tobacco Use   Smoking Status Never Smoker   Smokeless Tobacco Never Used     History reviewed  No pertinent family history  Meds/Allergies     Medications Prior to Admission   Medication    ferrous sulfate 324 (65 Fe) mg       No Known Allergies    Objective     Blood pressure 110/76, pulse (!) 49, temperature 98 5 °F (36 9 °C), temperature source Temporal, resp  rate 16, height 5' 8" (1 727 m), weight 68 kg (150 lb), SpO2 100 %  PHYSICAL EXAM    Gen: NAD  CV: RRR  CHEST: Clear  ABD: soft, NT/ND  EXT: no edema      ASSESSMENT/PLAN:  This is a 19y o  year old male here for EGD and he is stable and optimized for his procedure

## 2019-03-11 NOTE — DISCHARGE INSTRUCTIONS
Upper Endoscopy   WHAT YOU NEED TO KNOW:   An upper endoscopy is also called an upper gastrointestinal (GI) endoscopy, or an esophagogastroduodenoscopy (EGD)  You may feel bloated, gassy, or have some abdominal discomfort after your procedure  Your throat may be sore for 24 to 36 hours  You may burp or pass gas from air that is still inside your body  DISCHARGE INSTRUCTIONS:   Call 911 if:   · You have sudden chest pain or trouble breathing  Seek care immediately if:   · You feel dizzy or faint  · You have trouble swallowing  · You have severe throat pain  · Your bowel movements are very dark or black  · Your abdomen is hard and firm and you have severe pain  · You vomit blood  Contact your healthcare provider if:   · You feel full or bloated and cannot burp or pass gas  · You have not had a bowel movement for 3 days after your procedure  · You have neck pain  · You have a fever or chills  · You have nausea or are vomiting  · You have a rash or hives  · You have questions or concerns about your endoscopy  Relieve a sore throat:  Suck on throat lozenges or crushed ice  Gargle with a small amount of warm salt water  Mix 1 teaspoon of salt and 1 cup of warm water to make salt water  Relieve gas and discomfort from bloating:  Lie on your right side with a heating pad on your abdomen  Take short walks to help pass gas  Eat small meals until bloating is relieved  Rest after your procedure:  Do not drive or make important decisions until the day after your procedure  Return to your normal activity as directed  You can usually return to work the day after your procedure  Follow up with your healthcare provider as directed:  Write down your questions so you remember to ask them during your visits  © 2017 [employer]  Information is for End User's use only and may not be sold, redistributed or otherwise used for commercial purposes   All illustrations and images included in [employer]  are the copyrighted property of [employer] , [employer]  or [employer]  The above information is an  only  It is not intended as medical advice for individual conditions or treatments  Talk to your doctor, nurse or pharmacist before following any medical regimen to see if it is safe and effective for you

## 2019-03-11 NOTE — ANESTHESIA PREPROCEDURE EVALUATION
Review of Systems/Medical History          Cardiovascular  Negative cardio ROS    Pulmonary  Negative pulmonary ROS        GI/Hepatic      Comment: cecliac dz     Negative  ROS        Endo/Other  Negative endo/other ROS      GYN  Negative gynecology ROS          Hematology  Anemia ,     Musculoskeletal  Negative musculoskeletal ROS        Neurology  Negative neurology ROS      Psychology   Negative psychology ROS              Physical Exam    Airway    Mallampati score: II  TM Distance: >3 FB  Neck ROM: full     Dental   No notable dental hx     Cardiovascular  Comment: Negative ROS, Cardiovascular exam normal    Pulmonary  Pulmonary exam normal     Other Findings        Anesthesia Plan  ASA Score- 1     Anesthesia Type- IV sedation with anesthesia with ASA Monitors  Additional Monitors:   Airway Plan:         Plan Factors-    Induction- intravenous  Postoperative Plan-     Informed Consent- Anesthetic plan and risks discussed with patient

## 2019-03-11 NOTE — ANESTHESIA POSTPROCEDURE EVALUATION
Post-Op Assessment Note    CV Status:  Stable    Pain management: adequate     Mental Status:  Alert and awake   Hydration Status:  Euvolemic   PONV Controlled:  Controlled   Airway Patency:  Patent   Post Op Vitals Reviewed:  Yes              BP      Temp      Pulse    Resp      SpO2

## 2019-03-11 NOTE — OP NOTE
OPERATIVE REPORT  PATIENT NAME: Natasha Best    :  2000  MRN: [id number]  Pt Location: [facility] GI ROOM 01    SURGERY DATE: 3/11/2019    Surgeon(s) and Role:     * Autumn Santa MD - Primary    Preop Diagnosis:  Celiac disease [K90 0]    Post-Op Diagnosis Codes:     * Celiac disease [K90 0]    Procedure(s) (LRB):  ESOPHAGOGASTRODUODENOSCOPY (EGD) (N/A)    Specimen(s):  ID Type Source Tests Collected by Time Destination   1 : cold bx forcep celiac evaluation Tissue Duodenum TISSUE EXAM Autumn Santa MD 3/11/2019 1011        Estimated Blood Loss:   Minimal    Drains:  * No LDAs found *    Anesthesia Type:   Choice    Operative Indications:  Celiac disease [K90 0]  ESOPHAGOGASTRODUODENOSCOPY    PROCEDURE: EGD    SEDATION: Monitored anesthesia care, check anesthesia records    ASA Class: 2    INDICATIONS:  History of celiac disease    CONSENT:  Informed consent was obtained for the procedure, including sedation after explaining the risks and benefits of the procedure  Risks including but not limited to bleeding, perforation, infection, and missed lesion  PREPARATION:   Telemetry, pulse oximetry, blood pressure were monitored throughout the procedure  Patient was identified by myself both verbally and by visual inspection of ID band  DESCRIPTION:   Patient was placed in the left lateral decubitus position and was sedated with the above medication  The gastroscope was introduced in to the oropharynx and the esophagus was intubated under direct visualization  Scope was passed down the esophagus up to 2nd part of the duodenum  A careful inspection was made as the gastroscope was withdrawn, including a retroflexed view of the stomach; findings and interventions are described below  FINDINGS:    #1  Esophagus-normal    #2  Stomach- normal    #3   Duodenum- decreased folds in D2 otherwise duodenal bulb appeared to be normal   Random biopsy taken from D1 and D2 for further evaluation of celiac disease IMPRESSIONS:      Decreased folds in D2 otherwise duodenal bulb appeared to be normal   Random biopsy taken from D1 and D2 for further evaluation of celiac disease    RECOMMENDATIONS:     Follow-up biopsy results    COMPLICATIONS:  None; patient tolerated the procedure well      SPECIMENS:    ID Type Source Tests Collected by Time Destination   1 : cold bx forcep celiac evaluation Tissue Duodenum TISSUE EXAM Raghu Parrish MD 3/11/2019 1011        ESTIMATED BLOOD LOSS:  Minimal      SIGNATURE: Raghu Parrish MD  DATE: March 11, 2019  TIME: 10:27 AM

## 2019-03-11 NOTE — DISCHARGE INSTR - AVS FIRST PAGE
History and Physical -  [facility]  Kristian Montague 19 y o  male MRN: [id number]                  HPI: Kristian Montague is a 19y o  year old male who presents for evaluation of celiac disease    REVIEW OF SYSTEMS: Per the HPI, and otherwise unremarkable  Historical Information   Past Medical History:   Diagnosis Date    Anemia     iron defeincy anemia    Celiac disease      Past Surgical History:   Procedure Laterality Date    [place] ESOPHAGOGASTRODUODENOSCOPY TRANSORAL DIAGNOSTIC N/A 8/10/2018    Procedure: ESOPHAGOGASTRODUODENOSCOPY (EGD); Surgeon: Maddy Oliveros MD;  Location: [facility] GI LAB; Service: Gastroenterology     Social History   Social History     Substance and Sexual Activity   Alcohol Use No     Social History     Substance and Sexual Activity   Drug Use No     Social History     Tobacco Use   Smoking Status Never Smoker   Smokeless Tobacco Never Used     History reviewed  No pertinent family history  Meds/Allergies     Medications Prior to Admission   Medication    ferrous sulfate 324 (65 Fe) mg       No Known Allergies    Objective     Blood pressure 110/76, pulse (!) 49, temperature 98 5 °F (36 9 °C), temperature source Temporal, resp  rate 16, height 5' 8" (1 727 m), weight 68 kg (150 lb), SpO2 100 %  PHYSICAL EXAM    Gen: NAD  CV: RRR  CHEST: Clear  ABD: soft, NT/ND  EXT: no edema      ASSESSMENT/PLAN:  This is a 19y o  year old male here for EGD and he is stable and optimized for his procedure            OPERATIVE REPORT  PATIENT NAME: Kristian Montague    :  2000  MRN: [id number]  Pt Location: [facility] GI ROOM 01    SURGERY DATE: 3/11/2019    Surgeon(s) and Role:     * Maddy Oliveros MD - Primary    Preop Diagnosis:  Celiac disease [K90 0]    Post-Op Diagnosis Codes:     * Celiac disease [K90 0]    Procedure(s) (LRB):  ESOPHAGOGASTRODUODENOSCOPY (EGD) (N/A)    Specimen(s):  ID Type Source Tests Collected by Time Destination   1 : cold bx forcep celiac evaluation Tissue Duodenum TISSUE EXAM Christie Mckeon MD 3/11/2019 1011        Estimated Blood Loss:   Minimal    Drains:  * No LDAs found *    Anesthesia Type:   Choice    Operative Indications:  Celiac disease [K90 0]  ESOPHAGOGASTRODUODENOSCOPY    PROCEDURE: EGD    SEDATION: Monitored anesthesia care, check anesthesia records    ASA Class: 2    INDICATIONS:  History of celiac disease    CONSENT:  Informed consent was obtained for the procedure, including sedation after explaining the risks and benefits of the procedure  Risks including but not limited to bleeding, perforation, infection, and missed lesion  PREPARATION:   Telemetry, pulse oximetry, blood pressure were monitored throughout the procedure  Patient was identified by myself both verbally and by visual inspection of ID band  DESCRIPTION:   Patient was placed in the left lateral decubitus position and was sedated with the above medication  The gastroscope was introduced in to the oropharynx and the esophagus was intubated under direct visualization  Scope was passed down the esophagus up to 2nd part of the duodenum  A careful inspection was made as the gastroscope was withdrawn, including a retroflexed view of the stomach; findings and interventions are described below  FINDINGS:    #1  Esophagus-normal    #2  Stomach- normal    #3  Duodenum- decreased folds in D2 otherwise duodenal bulb appeared to be normal   Random biopsy taken from D1 and D2 for further evaluation of celiac disease         IMPRESSIONS:      Decreased folds in D2 otherwise duodenal bulb appeared to be normal   Random biopsy taken from D1 and D2 for further evaluation of celiac disease    RECOMMENDATIONS:     Follow-up biopsy results    COMPLICATIONS:  None; patient tolerated the procedure well      SPECIMENS:    ID Type Source Tests Collected by Time Destination   1 : cold bx forcep celiac evaluation Tissue Duodenum TISSUE EXAM Christie Mckeon MD 3/11/2019 1011        ESTIMATED BLOOD LOSS:  Minimal      SIGNATURE: Maddy Oliveros MD  DATE: March 11, 2019  TIME: 10:27 AM

## 2019-05-07 ENCOUNTER — APPOINTMENT (OUTPATIENT)
Dept: LAB | Facility: HOSPITAL | Age: 19
End: 2019-05-07
Attending: INTERNAL MEDICINE
Payer: COMMERCIAL

## 2019-05-07 DIAGNOSIS — K90.0 CELIAC DISEASE: ICD-10-CM

## 2019-05-07 PROCEDURE — 86255 FLUORESCENT ANTIBODY SCREEN: CPT

## 2019-05-07 PROCEDURE — 83516 IMMUNOASSAY NONANTIBODY: CPT

## 2019-05-07 PROCEDURE — 82784 ASSAY IGA/IGD/IGG/IGM EACH: CPT

## 2019-05-07 PROCEDURE — 36415 COLL VENOUS BLD VENIPUNCTURE: CPT

## 2019-05-09 LAB
ENDOMYSIUM IGA SER QL: POSITIVE
GLIADIN PEPTIDE IGA SER-ACNC: 10 UNITS (ref 0–19)
GLIADIN PEPTIDE IGG SER-ACNC: 13 UNITS (ref 0–19)
IGA SERPL-MCNC: 223 MG/DL (ref 90–386)
TTG IGA SER-ACNC: 12 U/ML (ref 0–3)
TTG IGG SER-ACNC: 4 U/ML (ref 0–5)

## 2019-05-23 DIAGNOSIS — K90.0 CELIAC DISEASE: Primary | ICD-10-CM

## 2020-11-06 ENCOUNTER — NURSE TRIAGE (OUTPATIENT)
Dept: OTHER | Facility: OTHER | Age: 20
End: 2020-11-06

## 2020-11-06 DIAGNOSIS — Z20.828 SARS-ASSOCIATED CORONAVIRUS EXPOSURE: Primary | ICD-10-CM

## 2020-11-06 DIAGNOSIS — Z20.828 SARS-ASSOCIATED CORONAVIRUS EXPOSURE: ICD-10-CM

## 2020-11-06 PROCEDURE — U0003 INFECTIOUS AGENT DETECTION BY NUCLEIC ACID (DNA OR RNA); SEVERE ACUTE RESPIRATORY SYNDROME CORONAVIRUS 2 (SARS-COV-2) (CORONAVIRUS DISEASE [COVID-19]), AMPLIFIED PROBE TECHNIQUE, MAKING USE OF HIGH THROUGHPUT TECHNOLOGIES AS DESCRIBED BY CMS-2020-01-R: HCPCS | Performed by: FAMILY MEDICINE

## 2020-11-07 LAB — SARS-COV-2 RNA SPEC QL NAA+PROBE: NOT DETECTED

## 2021-01-07 ENCOUNTER — OFFICE VISIT (OUTPATIENT)
Dept: GASTROENTEROLOGY | Facility: MEDICAL CENTER | Age: 21
End: 2021-01-07
Payer: COMMERCIAL

## 2021-01-07 VITALS
HEIGHT: 68 IN | SYSTOLIC BLOOD PRESSURE: 118 MMHG | HEART RATE: 84 BPM | BODY MASS INDEX: 22.73 KG/M2 | WEIGHT: 150 LBS | DIASTOLIC BLOOD PRESSURE: 78 MMHG

## 2021-01-07 DIAGNOSIS — R14.0 BLOATING: ICD-10-CM

## 2021-01-07 DIAGNOSIS — K90.0 CELIAC DISEASE: Primary | ICD-10-CM

## 2021-01-07 DIAGNOSIS — R19.8 ALTERNATING CONSTIPATION AND DIARRHEA: ICD-10-CM

## 2021-01-07 PROCEDURE — 99214 OFFICE O/P EST MOD 30 MIN: CPT | Performed by: PHYSICIAN ASSISTANT

## 2021-01-07 RX ORDER — HYOSCYAMINE SULFATE 0.125 MG
0.12 TABLET ORAL EVERY 4 HOURS PRN
Qty: 60 TABLET | Refills: 1 | Status: SHIPPED | OUTPATIENT
Start: 2021-01-07

## 2021-01-07 NOTE — PROGRESS NOTES
[facility] - Outpatient Follow-up Note  Santi Wood 23 y o  male MRN: [id number]  Encounter: [id number]          ASSESSMENT AND PLAN:      1  Celiac disease: has not been seen in about 1 year due to no insurance  He is eating a vegan and gluten free diet   - Food Specific IgG Allergy (Adult) Panel; Future  - hyoscyamine (Levsin) 0 125 MG tablet; Take 1 tablet (0 125 mg total) by mouth every 4 (four) hours as needed for cramping  Dispense: 60 tablet; Refill: 1  - Tissue transglutaminase, IgA; Future  - IgA; Future  - Vitamin D 25 hydroxy; Future  - Vitamin B12; Future  - Iron Panel (Includes Ferritin, Iron Sat%, Iron, and TIBC); Future  - H  pylori antibody, IgG; Future  - DXA bone density spine hip and pelvis; Future    2  Bloating  3  Alternating constipation and diarrhea: he admits to abdominal bloating, alternating bowel habits and abdominal cramping  This sounds more like IBS but could also consider his celiac disease   -start daily probiotic  -start fiber supplement  -start levsin  -low fod map diet handout given and discussed    ______________________________________________________________________    SUBJECTIVE:  Oscar Long is a 18-year-old male with a pmh celiac disease with elevated TTG IgA levels >100  He had an EGD significant for decreased folds in 2nd portion the duodenum, biopsies consistent with type 1 March celiac disease  He has not been seen in over a year secondary to lack of health insurance  Today he complains of intermittent episodes of abdominal bloating, alternating bowel habits between constipation and diarrhea and mild abdominal cramping  He denies any melena or hematochezia  He states that he is eating beginning gluten free  REVIEW OF SYSTEMS IS OTHERWISE NEGATIVE        Historical Information   Past Medical History:   Diagnosis Date    Anemia     iron defeincy anemia    Celiac disease      Past Surgical History:   Procedure Laterality Date  ESOPHAGOGASTRODUODENOSCOPY N/A 3/11/2019    Procedure: ESOPHAGOGASTRODUODENOSCOPY (EGD); Surgeon: Landen Verdugo MD;  Location: [facility] GI LAB; Service: Gastroenterology    [place] ESOPHAGOGASTRODUODENOSCOPY TRANSORAL DIAGNOSTIC N/A 8/10/2018    Procedure: ESOPHAGOGASTRODUODENOSCOPY (EGD); Surgeon: Landen Verdugo MD;  Location: [facility] GI LAB; Service: Gastroenterology    UPPER GASTROINTESTINAL ENDOSCOPY  03/11/2019     Social History   Social History     Substance and Sexual Activity   Alcohol Use No     Social History     Substance and Sexual Activity   Drug Use No     Social History     Tobacco Use   Smoking Status Never Smoker   Smokeless Tobacco Never Used     History reviewed  No pertinent family history  Meds/Allergies       Current Outpatient Medications:     ferrous sulfate 324 (65 Fe) mg    hyoscyamine (Levsin) 0 125 MG tablet    No Known Allergies        Objective     Blood pressure 118/78, pulse 84, height 5' 8" (1 727 m), weight 68 kg (150 lb)  Body mass index is 22 81 kg/m²  PHYSICAL EXAM:      General Appearance:   Alert, cooperative, no distress   HEENT:   Normocephalic, atraumatic, anicteric      Neck:  Supple, symmetrical, trachea midline   Lungs:   Clear to auscultation bilaterally; no rales, rhonchi or wheezing; respirations unlabored    Heart[de-identified]   Regular rate and rhythm; no murmur, rub, or gallop  Abdomen:   Soft, non-tender, non-distended; normal bowel sounds; no masses, no organomegaly    Genitalia:   Deferred    Rectal:   Deferred    Extremities:  No cyanosis, clubbing or edema    Pulses:  2+ and symmetric    Skin:  No jaundice, rashes, or lesions    Lymph nodes:  No palpable cervical lymphadenopathy        Lab Results:   No visits with results within 1 Day(s) from this visit  Latest known visit with results is:   Orders Only on 11/06/2020   Component Date Value    SARS-CoV-2  11/06/2020 Not Detected          Radiology Results:   No results found

## 2021-01-08 ENCOUNTER — APPOINTMENT (OUTPATIENT)
Dept: LAB | Facility: HOSPITAL | Age: 21
End: 2021-01-08
Payer: COMMERCIAL

## 2021-01-08 DIAGNOSIS — K90.0 CELIAC DISEASE: ICD-10-CM

## 2021-01-08 LAB
25(OH)D3 SERPL-MCNC: 26.8 NG/ML (ref 30–100)
FERRITIN SERPL-MCNC: 60 NG/ML (ref 8–388)
IGA SERPL-MCNC: 229 MG/DL (ref 70–400)
IRON SATN MFR SERPL: 33 %
IRON SERPL-MCNC: 105 UG/DL (ref 65–175)
TIBC SERPL-MCNC: 316 UG/DL (ref 250–450)
VIT B12 SERPL-MCNC: 1770 PG/ML (ref 100–900)

## 2021-01-08 PROCEDURE — 82607 VITAMIN B-12: CPT

## 2021-01-08 PROCEDURE — 82306 VITAMIN D 25 HYDROXY: CPT

## 2021-01-08 PROCEDURE — 83516 IMMUNOASSAY NONANTIBODY: CPT

## 2021-01-08 PROCEDURE — 83540 ASSAY OF IRON: CPT

## 2021-01-08 PROCEDURE — 36415 COLL VENOUS BLD VENIPUNCTURE: CPT

## 2021-01-08 PROCEDURE — 82785 ASSAY OF IGE: CPT

## 2021-01-08 PROCEDURE — 82728 ASSAY OF FERRITIN: CPT

## 2021-01-08 PROCEDURE — 83550 IRON BINDING TEST: CPT

## 2021-01-08 PROCEDURE — 86003 ALLG SPEC IGE CRUDE XTRC EA: CPT

## 2021-01-08 PROCEDURE — 82784 ASSAY IGA/IGD/IGG/IGM EACH: CPT

## 2021-01-09 LAB — TTG IGA SER-ACNC: 2 U/ML (ref 0–3)

## 2021-01-11 LAB
ALLERGEN COMMENT: ABNORMAL
ALMOND IGE QN: <0.1 KUA/I
CASHEW NUT IGE QN: <0.1 KUA/I
CODFISH IGE QN: <0.1 KUA/I
EGG WHITE IGE QN: <0.1 KUA/I
GLUTEN IGE QN: <0.1 KUA/I
HAZELNUT IGE QN: 0.1 KUA/L
MILK IGE QN: <0.1 KUA/I
PEANUT IGE QN: <0.1 KUA/I
SALMON IGE QN: <0.1 KUA/I
SCALLOP IGE QN: 0.26 KUA/L
SESAME SEED IGE QN: <0.1 KUA/I
SHRIMP IGE QN: 0.73 KUA/L
SOYBEAN IGE QN: <0.1 KUA/I
TOTAL IGE SMQN RAST: 21.6 KU/L (ref 0–113)
TUNA IGE QN: <0.1 KUA/I
WALNUT IGE QN: <0.1 KUA/I
WHEAT IGE QN: <0.1 KUA/I

## 2021-01-13 ENCOUNTER — TELEPHONE (OUTPATIENT)
Dept: GASTROENTEROLOGY | Facility: MEDICAL CENTER | Age: 21
End: 2021-01-13

## 2021-01-13 NOTE — TELEPHONE ENCOUNTER
----- Message from Denise Rico PA-C sent at 1/11/2021 11:38 AM EST -----  Please call and let the patient know his labs were all normal with exception of low vitamin d and high b12  He should stop supplements with b12 and start taking 600-800 units of D3 daily and we can repeat in 3 months  Thank you      Please call and let the patient know his allergy profile showed he is allergic to scallops, shrimp and hazelnut  If he would like a referral to an allergist for further testing, id be happy to provide one

## 2021-02-12 ENCOUNTER — TELEPHONE (OUTPATIENT)
Dept: GASTROENTEROLOGY | Facility: MEDICAL CENTER | Age: 21
End: 2021-02-12

## 2021-02-12 NOTE — TELEPHONE ENCOUNTER
Patient was calling regarding his lab on 01/08 for celiac  Patient is requesting at the code be changed to preventative  The patient was advised the coding can not be changed to Preventative because it is a preexisting condition   Patient understood

## 2025-03-20 NOTE — PROGRESS NOTES
01003 in progress. Awaiting orencia authorization. Rx has been removed from BPQ per policy regarding prescriptions in queue longer than 30 days. Rx will be reentered and e-prescribed to desired pharmacy upon approval.      Rehan Sarmiento, PharmD  Clinical Pharmacy Specialist  03/20/25   [facility] - Outpatient Follow-up Note  Tien Topete 19 y o  male MRN: [id number]  Encounter: [id number]          ASSESSMENT AND PLAN:      1  Celiac disease  He was recently diagnosed with celiac disease and has been following a strict gluten free diet  He reports some fatigue and mild weight loss, but denies abdominal cramping and diarrhea  We will check CBC, iron panel, and repeat his tissue transglutaminase IgA levels  We will also refer to a nutritionist for gluten free diet recommendations  He will follow-up in 6 months time  - CBC and differential; Future  - Iron Panel; Future  - Tissue transglutaminase, IgA; Future  - Ambulatory referral to Nutrition Services; Future  ______________________________________________________________________    SUBJECTIVE:  23-year-old male presenting for follow-up of celiac disease  He was found to have elevated TTG IgA levels > 100  EGD significant for decreased folds in the 2nd portion of the duodenum, biopsies consistent with type 1 Marsh celiac disease  He states he has been following a strict gluten free diet  He has not seen a nutritionist yet  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  He does admit to feeling more fatigued lately  He does not eat meat  He denies nausea, vomiting, abdominal pain, diarrhea, and bleeding  He admits to losing 5 pounds in recent weeks  REVIEW OF SYSTEMS IS OTHERWISE NEGATIVE  Historical Information   Past Medical History:   Diagnosis Date    Celiac disease      Past Surgical History:   Procedure Laterality Date    [place] ESOPHAGOGASTRODUODENOSCOPY TRANSORAL DIAGNOSTIC N/A 8/10/2018    Procedure: ESOPHAGOGASTRODUODENOSCOPY (EGD); Surgeon: Cora Smith MD;  Location: [facility] GI LAB; Service: Gastroenterology     Social History   History   Alcohol Use No     History   Drug Use No     History   Smoking Status    Never Smoker   Smokeless Tobacco    Never Used     History reviewed   No pertinent family history  Meds/Allergies     No current outpatient prescriptions on file  No Known Allergies        Objective     Blood pressure 115/72, pulse (!) 54, temperature (!) 96 1 °F (35 6 °C), temperature source Tympanic, height 5' 8" (1 727 m), weight 65 4 kg (144 lb 3 2 oz)  Body mass index is 21 93 kg/m²  PHYSICAL EXAM:      General Appearance:   Alert, cooperative, no distress   HEENT:   Normocephalic, atraumatic, anicteric      Neck:  Supple, symmetrical, trachea midline   Lungs:   Clear to auscultation bilaterally; no rales, rhonchi or wheezing; respirations unlabored    Heart[de-identified]   Regular rate and rhythm; no murmur, rub, or gallop  Abdomen:   Soft, non-tender, non-distended; normal bowel sounds; no masses, no organomegaly    Genitalia:   Deferred    Rectal:   Deferred    Extremities:  No cyanosis, clubbing or edema    Pulses:  2+ and symmetric    Skin:  No jaundice, rashes, or lesions    Lymph nodes:  No palpable cervical lymphadenopathy        Lab Results:   No visits with results within 1 Day(s) from this visit  Latest known visit with results is:   Admission on 08/10/2018, Discharged on 08/10/2018   Component Date Value    Case Report 08/10/2018                      Value:Surgical Pathology Report                         Case: [id number]                                   Authorizing Provider:  Cee Castillo MD           Collected:           08/10/2018 1304              Ordering Location:     [facility]        Received:            08/10/2018 [address] [facility] Endoscopy                                                     Pathologist:           Demario Kearney MD                                                              Specimen:    Duodenum, cold bx's r/o celiac                                                             Final Diagnosis 08/10/2018                      Value: This result contains rich text formatting which cannot be displayed here   Note 08/10/2018                      Value: This result contains rich text formatting which cannot be displayed here   Additional Information 08/10/2018                      Value: This result contains rich text formatting which cannot be displayed here  Rosie Live Silvina Description 08/10/2018                      Value: This result contains rich text formatting which cannot be displayed here  Radiology Results:   No results found